# Patient Record
(demographics unavailable — no encounter records)

---

## 2017-06-17 NOTE — EMERGENCY ROOM REPORT
History of Present Illness


General


Chief Complaint:  Pain





Present Illness


HPI


The patient is a 53-year-old female presenting for right arm pain which began 2 

weeks prior.  Denies any known injury.  She does admit to lifting heavy at work 

on a daily basis.  Pain is described as an 8/10 dull ache and is worse with 

touch and movement.  She denies radiating pain.  She denies trying any 

medications. She denies other symptoms including rash, F, chills, numbness


Allergies:  


Coded Allergies:  


     No Known Allergies (Unverified , 4/21/15)





Patient History


Past Medical History:  see triage record


Pertinent Family History:  none


Reviewed Nursing Documentation:  PMH: Agreed, PSxH: Agreed





Nursing Documentation-PMH


Hx Hypertension:  Yes


Hx Cerebrovascular Accident:  Yes





Review of Systems


All Other Systems:  negative except mentioned in HPI





Physical Exam





Vital Signs








  Date Time  Temp Pulse Resp B/P Pulse Ox O2 Delivery O2 Flow Rate FiO2


 


6/17/17 16:18 97.7 66 20 129/90 100 Room Air  








Sp02 EP Interpretation:  reviewed, normal


General Appearance:  no apparent distress, alert, GCS 15, non-toxic


Head:  normocephalic, atraumatic


Eyes:  bilateral eye PERRL, bilateral eye normal inspection


ENT:  hearing grossly normal, normal pharynx, no angioedema, normal voice


Neck:  full range of motion, supple/symm/no masses


Musculoskeletal:  normal inspection, digits/nails normal, normal range of motion

, tender - TTP over the R medial epicondyle


Neurologic:  alert, oriented x3, responsive, motor strength/tone normal, 

sensory intact, speech normal


Psychiatric:  judgement/insight normal, memory normal, mood/affect normal, no 

suicidal/homicidal ideation


Skin:  normal color, no rash, warm/dry, well hydrated


Lymphatic:  no adenopathy





Medical Decision Making


PA Attestation


Dr. Yung is my supervising physician. Patient management was discussed with 

my supervising physician


Diagnostic Impression:  


 Primary Impression:  


 Epicondylitis elbow, medial


 Qualified Codes:  M77.01 - Medial epicondylitis, right elbow


ER Course


The patient is a 53-year-old female presenting with right elbow pain





Ddx considered include but not limited to sprain/strain, fracture, contusion, 

tendonitis





PE: NAD


Right arm: No obvious deformity.  Full active range of motion of the elbow.  No 

ecchymosis no edema.


There is tenderness to palpation over the medial epicondyle and noted pain with 

pronation against resistance.





The patient is discharged home with RICE instructions, motrin, and prescription 

for a forearm band. 





ER precautions given





Last Vital Signs








  Date Time  Temp Pulse Resp B/P Pulse Ox O2 Delivery O2 Flow Rate FiO2


 


6/17/17 16:46 97.7  20 129/90 100 Room Air  


 


6/17/17 16:18  66      








Status:  improved


Disposition:  HOME, SELF-CARE


Condition:  Improved


Scripts


Arm Brace (ELBOW STRAP) 1 Each Each


1 EACH , #1


   Prov: JADYN LOVING         6/17/17 


Ibuprofen* (MOTRIN*) 600 Mg Tablet


600 MG ORAL Q8H Y for For Pain, #30 TAB 0 Refills


   Prov: JADYN LOVING         6/17/17


Patient Instructions:  Medial Epicondylitis With Rehab-[facility]





Additional Instructions:  


I discussed my findings with the patient. All questions and concerns have been 

answered. Treatment and medication compliance have been addressed. I advised 

the patient that they need to follow up with PMD in 3-5 days. Return to ED if 

pain remains or worsens, numbness or tingling occurs, new rash is noticed, 

fever is noticed, or if needed for any reason. Patient verbalized understanding 

of discharge instructions.











JADYN LOVING Jun 17, 2017 17:23

## 2018-11-10 NOTE — DIAGNOSTIC IMAGING REPORT
EXAM:

  XR Chest, 1 View

 

CLINICAL HISTORY:

  SOB

 

TECHNIQUE:

  Frontal view of the chest.

 

COMPARISON:

  No relevant prior studies available.

 

FINDINGS:

  Lungs:  Subtle opacity left lower lobe may be atelectasis/infiltrate.  

Vascularity within normal limits.

  Pleural space:  Unremarkable.  No pneumothorax.

  Heart:  Heart size upper limits normal.

  Mediastinum:  Unremarkable.

  Bones/joints:  Mild degenerative changes of the spine.

 

IMPRESSION:     

  Subtle opacity left lower lobe may be atelectasis/infiltrate.

## 2018-11-10 NOTE — EMERGENCY ROOM REPORT
History of Present Illness


General


Chief Complaint:  Chest Pain


Source:  Patient, Medical Record





Present Illness


HPI


The patient is a 55-year-old female presented after increased the shortness of 

breath and chest discomfort.  Patient reports having increased nonproductive 

cough.  Patient had onset of symptoms approximate 3 days prior to arrival.  She 

denies any fever.  She had not been having any leg pain or swelling.  She 

denies productive cough.  She has prior history of hypertension.  Patient is not

- a smoker.


Allergies:  


Coded Allergies:  


     No Known Allergies (Unverified , 11/10/18)





Patient History


Past Medical History:  see triage record


Reviewed Nursing Documentation:  PMH: Agreed; PSxH: Agreed





Nursing Documentation-PMH


Past Medical History:  No History, Except For


Hx Hypertension:  Yes


Hx Cerebrovascular Accident:  Yes - 2004





Review of Systems


All Other Systems:  negative except mentioned in HPI





Physical Exam





Vital Signs








  Date Time  Temp Pulse Resp B/P (MAP) Pulse Ox O2 Delivery O2 Flow Rate FiO2


 


11/10/18 13:46 98.8 72 26 160/67 98 Room Air  








Sp02 EP Interpretation:  reviewed, normal


General Appearance:  normal inspection, well appearing, no apparent distress, 

alert, obese


Head:  atraumatic


ENT:  normal ENT inspection, hearing grossly normal, normal voice


Neck:  normal inspection, full range of motion, supple, no bony tend


Respiratory:  normal inspection, lungs clear, normal breath sounds, no 

respiratory distress, no retraction, no wheezing


Cardiovascular #1:  regular rate, rhythm, no edema


Gastrointestinal:  normal inspection, normal bowel sounds, non tender, soft, no 

guarding, no hernia


Genitourinary:  no CVA tenderness


Musculoskeletal:  normal inspection, back normal, normal range of motion


Neurologic:  normal inspection, alert, oriented x3, responsive, CNs III-XII nml 

as tested, speech normal


Psychiatric:  normal inspection, judgement/insight normal, mood/affect normal


Skin:  normal inspection, normal color, no rash





Medical Decision Making


Diagnostic Impression:  


 Primary Impression:  


 Chest pain


 Additional Impressions:  


 Pneumonia


 Abnormal EKG


ER Course


Patient presented for chest pain.Differential diagnosis included but was not 

limited to acute coronary syndrome, pulmonary embolism, pneumonia, aortic 

dissection, shingles, pneumothorax, aortic dissection, esophageal rupture, 

pericarditis. Because of complexity of patient's case laboratory testing and 

imaging studies were ordered.


The EKG interpreted by me showed normal sinus rhythm with a rate of 69 with 

trace ST depression in the lateral inferior leads.  The patient noted have 

elevated white blood count as well as elevated lactic acid level.  Chest x-ray 

one view read by radiology showed subtle left sided infiltrate.  The patient 

was given IV azithromycin she is also giving a breathing treatment as well as 

IV steroids.  The patient was subsequently given aspirin. The patient's 

previous EKG was noted to be normalThe patient was [facility] 

[facility]





Labs








Test


  11/10/18


14:16 11/10/18


14:40


 


White Blood Count


  16.2 K/UL


(4.8-10.8) 


 


 


Red Blood Count


  5.39 M/UL


(4.20-5.40) 


 


 


Hemoglobin


  15.7 G/DL


(12.0-16.0) 


 


 


Hematocrit


  46.8 %


(37.0-47.0) 


 


 


Mean Corpuscular Volume 87 FL (80-99)  


 


Mean Corpuscular Hemoglobin


  29.1 PG


(27.0-31.0) 


 


 


Mean Corpuscular Hemoglobin


Concent 33.4 G/DL


(32.0-36.0) 


 


 


Red Cell Distribution Width


  12.7 %


(11.6-14.8) 


 


 


Platelet Count


  362 K/UL


(150-450) 


 


 


Mean Platelet Volume


  6.0 FL


(6.5-10.1) 


 


 


Neutrophils (%) (Auto)


  72.0 %


(45.0-75.0) 


 


 


Lymphocytes (%) (Auto)


  19.8 %


(20.0-45.0) 


 


 


Monocytes (%) (Auto)


  6.9 %


(1.0-10.0) 


 


 


Eosinophils (%) (Auto)


  0.7 %


(0.0-3.0) 


 


 


Basophils (%) (Auto)


  0.7 %


(0.0-2.0) 


 


 


Sodium Level


  141 MMOL/L


(136-145) 


 


 


Potassium Level


  3.2 MMOL/L


(3.5-5.1) 


 


 


Chloride Level


  102 MMOL/L


() 


 


 


Carbon Dioxide Level


  29 MMOL/L


(21-32) 


 


 


Anion Gap


  10 mmol/L


(5-15) 


 


 


Blood Urea Nitrogen


  13 mg/dL


(7-18) 


 


 


Creatinine


  1.1 MG/DL


(0.55-1.30) 


 


 


Estimat Glomerular Filtration


Rate > 60 mL/min


(>60) 


 


 


Glucose Level


  90 MG/DL


() 


 


 


Calcium Level


  9.7 MG/DL


(8.5-10.1) 


 


 


Total Bilirubin


  0.1 MG/DL


(0.2-1.0) 


 


 


Aspartate Amino Transf


(AST/SGOT) 15 U/L (15-37) 


  


 


 


Alanine Aminotransferase


(ALT/SGPT) 36 U/L (12-78) 


  


 


 


Alkaline Phosphatase


  94 U/L


() 


 


 


Total Creatine Kinase


  155 U/L


() 


 


 


Creatine Kinase MB


  2.3 NG/ML


(0.0-3.6) 


 


 


Creatine Kinase MB Relative


Index 1.4 


  


 


 


Troponin I


  0.098 ng/mL


(0.000-0.056) 


 


 


Pro-B-Type Natriuretic Peptide


  220 pg/mL


(0-125) 


 


 


Total Protein


  9.0 G/DL


(6.4-8.2) 


 


 


Albumin


  3.7 G/DL


(3.4-5.0) 


 


 


Globulin 5.3 g/dL  


 


Albumin/Globulin Ratio 0.7 (1.0-2.7)  








EKG Diagnostic Results


Rate:  normal


Rhythm:  NSR


ST Segments:  other - inferior twave inversion, lateral st depression





Last Vital Signs








  Date Time  Temp Pulse Resp B/P (MAP) Pulse Ox O2 Delivery O2 Flow Rate FiO2


 


11/10/18 13:46 98.8 72 26 160/67 98 Room Air  








Status:  unchanged


Disposition:  [facility]


Condition:  Serious











Ant Yung MD Nov 10, 2018 13:55